# Patient Record
Sex: MALE | Race: WHITE | NOT HISPANIC OR LATINO | URBAN - METROPOLITAN AREA
[De-identification: names, ages, dates, MRNs, and addresses within clinical notes are randomized per-mention and may not be internally consistent; named-entity substitution may affect disease eponyms.]

---

## 2020-08-06 ENCOUNTER — EMERGENCY (EMERGENCY)
Facility: HOSPITAL | Age: 70
LOS: 1 days | Discharge: HOME | End: 2020-08-06
Attending: EMERGENCY MEDICINE | Admitting: EMERGENCY MEDICINE
Payer: MEDICARE

## 2020-08-06 VITALS
TEMPERATURE: 98 F | OXYGEN SATURATION: 96 % | SYSTOLIC BLOOD PRESSURE: 131 MMHG | WEIGHT: 145.06 LBS | RESPIRATION RATE: 16 BRPM | HEART RATE: 94 BPM | DIASTOLIC BLOOD PRESSURE: 93 MMHG | HEIGHT: 69 IN

## 2020-08-06 PROCEDURE — 99282 EMERGENCY DEPT VISIT SF MDM: CPT | Mod: GC

## 2020-08-06 NOTE — ED PROVIDER NOTE - PATIENT PORTAL LINK FT
You can access the FollowMyHealth Patient Portal offered by [facility] by registering at the following website: [email or web address]. By joining [employer]’s FollowMyHealth portal, you will also be able to view your health information using other applications (apps) compatible with our system.

## 2020-08-06 NOTE — ED PROVIDER NOTE - ATTENDING CONTRIBUTION TO CARE
69 y.o. male with R elbow bursitis, wrapped with compression dressing, supportive care at home, NSAIDs, orthopedic outpatient followup

## 2020-08-06 NOTE — ED PROVIDER NOTE - OBJECTIVE STATEMENT
70 yo male with unremarkable pmhx presenting with right elbow swelling for 10 days. Patient states he woke up and noticed his posterior elbow area to be soft and swollen. Denies any pain or known injuries or trauma. 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 Came in to ED for further evaluation.    Denies fevers, rash, elbow pain.

## 2020-08-06 NOTE — ED PROVIDER NOTE - NSFOLLOWUPINSTRUCTIONS_ED_ALL_ED_FT
Activities as tolerated. Please encourage good oral and fluid intake. For pain, please take Motrin 400mg every 4 hours as needed, or Tylenol 650mg every 6 hours as needed.    Please keep elbow compressed for comfort and resolution of swelling. Use warm packs on elbow as needed.    Please see your primary care doctor within 24-48 hours for further management of your symptoms.  Please follow up with orthopedics in one week for further evaluation of your symptoms.    Please seek emergent medical management if you have any worsening signs or symptoms, such as new spreading rash, worsening spreading of swelling, chest pain, difficulty breathing, loss of consciousness, or persistent vomiting.

## 2020-08-06 NOTE — ED PROVIDER NOTE - CLINICAL SUMMARY MEDICAL DECISION MAKING FREE TEXT BOX
68 yo male with unremarkable pmhx presenting with right elbow swelling for 10 days. suggestive of bursitis. will compress, recommend warm compresses, and will dc with supportive care, return precautions, and ortho f/u

## 2020-08-06 NOTE — ED PROVIDER NOTE - CARE PROVIDER_API CALL
Steven Bush)  Orthopedics  [address] [place] [address]  Phone: [phone]  Fax: [phone]  Follow Up Time: 4-6 Days

## 2020-08-06 NOTE — ED PROVIDER NOTE - PROGRESS NOTE DETAILS
Rivera Cantrell PGY3  upon awaiting discharge, patient eloped, stated he did not know about the "higher co pay"

## 2020-08-06 NOTE — ED PROVIDER NOTE - MUSCULOSKELETAL, MLM
right elbow bursitis noted on exam with soft swelling of bursa at elbow, nontender to palpation. UE ROM intact BL with no pain.

## 2020-08-10 DIAGNOSIS — M25.521 PAIN IN RIGHT ELBOW: ICD-10-CM

## 2020-08-10 DIAGNOSIS — M70.31 OTHER BURSITIS OF ELBOW, RIGHT ELBOW: ICD-10-CM
